# Patient Record
Sex: MALE | Race: WHITE | NOT HISPANIC OR LATINO | URBAN - METROPOLITAN AREA
[De-identification: names, ages, dates, MRNs, and addresses within clinical notes are randomized per-mention and may not be internally consistent; named-entity substitution may affect disease eponyms.]

---

## 2017-12-02 ENCOUNTER — EMERGENCY (EMERGENCY)
Facility: HOSPITAL | Age: 22
LOS: 0 days | Discharge: HOME | End: 2017-12-02

## 2017-12-02 DIAGNOSIS — Z20.2 CONTACT WITH AND (SUSPECTED) EXPOSURE TO INFECTIONS WITH A PREDOMINANTLY SEXUAL MODE OF TRANSMISSION: ICD-10-CM

## 2018-12-03 PROBLEM — Z00.00 ENCOUNTER FOR PREVENTIVE HEALTH EXAMINATION: Status: ACTIVE | Noted: 2018-12-03

## 2019-01-16 ENCOUNTER — APPOINTMENT (OUTPATIENT)
Dept: UROLOGY | Facility: CLINIC | Age: 24
End: 2019-01-16
Payer: COMMERCIAL

## 2019-01-16 VITALS
HEART RATE: 54 BPM | WEIGHT: 150 LBS | HEIGHT: 70 IN | SYSTOLIC BLOOD PRESSURE: 102 MMHG | DIASTOLIC BLOOD PRESSURE: 50 MMHG | BODY MASS INDEX: 21.47 KG/M2

## 2019-01-16 DIAGNOSIS — Z80.8 FAMILY HISTORY OF MALIGNANT NEOPLASM OF OTHER ORGANS OR SYSTEMS: ICD-10-CM

## 2019-01-16 DIAGNOSIS — Z78.9 OTHER SPECIFIED HEALTH STATUS: ICD-10-CM

## 2019-01-16 DIAGNOSIS — I86.1 SCROTAL VARICES: ICD-10-CM

## 2019-01-16 PROCEDURE — 99203 OFFICE O/P NEW LOW 30 MIN: CPT

## 2019-01-16 NOTE — LETTER BODY
[Dear  ___] : Dear  [unfilled], [Consult Letter:] : I had the pleasure of evaluating your patient, [unfilled]. [Please see my note below.] : Please see my note below. [Consult Closing:] : Thank you very much for allowing me to participate in the care of this patient.  If you have any questions, please do not hesitate to contact me. [Sincerely,] : Sincerely, [FreeTextEntry2] : Dr. Chucho Morley

## 2019-01-16 NOTE — HISTORY OF PRESENT ILLNESS
[Currently Experiencing ___] :  [unfilled] [5] : 5 [Aching] : aching [___ Month(s) Ago] : [unfilled] month(s) ago [Intermittent] : intermittently [Mild] : mild [Unchanged] : unchanged [None] : No relieving factors are noted [FreeTextEntry1] : MIKAYLA HARRY is a 23 year old male with a past medical history of left varicocele diagnosed over 14 years ago. Presents to the office today for intermittent right testicular aching pain x 3 months. Patient states that pain last a couple of hours and resolves on its own. Denies any trauma to area and no heavy lifting. Patient was seen by PMD and US was done which revealed moderate left varicocele. Denies urinary symptoms, including dysuria, hematuria, flank pain,fever or chills. No history of STD's. Has 1 steady partner, no protection used.\par \par states has low sex drive and energy\par \par US 11/2/2018\par -moderate left varicocele 0.37 cm\par -unremarkable testes, epididymitis. \par  [de-identified] : right testicle [de-identified] : after intercourse

## 2019-01-16 NOTE — PHYSICAL EXAM
[General Appearance - Well Developed] : well developed [General Appearance - Well Nourished] : well nourished [Normal Appearance] : normal appearance [Well Groomed] : well groomed [General Appearance - In No Acute Distress] : no acute distress [Edema] : no peripheral edema [Respiration, Rhythm And Depth] : normal respiratory rhythm and effort [Exaggerated Use Of Accessory Muscles For Inspiration] : no accessory muscle use [Abdomen Soft] : soft [Abdomen Tenderness] : non-tender [Costovertebral Angle Tenderness] : no ~M costovertebral angle tenderness [Normal Station and Gait] : the gait and station were normal for the patient's age [] : no rash [No Focal Deficits] : no focal deficits [Oriented To Time, Place, And Person] : oriented to person, place, and time [Affect] : the affect was normal [Mood] : the mood was normal [Not Anxious] : not anxious [Urethral Meatus] : meatus normal [Testes Mass (___cm)] : there were no testicular masses [FreeTextEntry1] : left grade 3 varicocele, right grade 2, left testicle slightly atrophic

## 2019-01-16 NOTE — ASSESSMENT
[FreeTextEntry1] : MIKAYLA HARRY is a 23 year old male with a past medical history of left varicocele diagnosed over 14 years ago. Presents to the office today for intermittent right testicular aching pain x 3 months. Patient states that pain last a couple of hours and resolves on its own. Denies any trauma to area and no heavy lifting. Patient was seen by PMD and US was done which revealed moderate left varicocele. Denies urinary symptoms, including dysuria, hematuria, flank pain,fever or chills. No history of STD's. Has 1 steady partner, no protection used.\par \par states has low sex drive and energy\par \par US 11/2/2018\par -moderate left varicocele 0.37 cm\par -unremarkable testes, epididymitis. \par

## 2019-01-19 ENCOUNTER — LABORATORY RESULT (OUTPATIENT)
Age: 24
End: 2019-01-19

## 2019-01-19 ENCOUNTER — OUTPATIENT (OUTPATIENT)
Dept: OUTPATIENT SERVICES | Facility: HOSPITAL | Age: 24
LOS: 1 days | Discharge: HOME | End: 2019-01-19

## 2019-01-19 DIAGNOSIS — F52.0 HYPOACTIVE SEXUAL DESIRE DISORDER: ICD-10-CM

## 2019-01-23 LAB
T4 SERPL-MCNC: 6.8 UG/DL
TSH SERPL-ACNC: 0.84 UIU/ML

## 2019-01-28 LAB
TESTOST BND SERPL-MCNC: 17.5 PG/ML
TESTOST SERPL-MCNC: 686.7 NG/DL

## 2019-02-06 ENCOUNTER — APPOINTMENT (OUTPATIENT)
Dept: UROLOGY | Facility: CLINIC | Age: 24
End: 2019-02-06
Payer: COMMERCIAL

## 2019-02-06 VITALS — BODY MASS INDEX: 21.47 KG/M2 | HEIGHT: 70 IN | WEIGHT: 150 LBS

## 2019-02-06 DIAGNOSIS — N50.811 RIGHT TESTICULAR PAIN: ICD-10-CM

## 2019-02-06 DIAGNOSIS — I86.1 SCROTAL VARICES: ICD-10-CM

## 2019-02-06 DIAGNOSIS — F52.0 HYPOACTIVE SEXUAL DESIRE DISORDER: ICD-10-CM

## 2019-02-06 PROCEDURE — 99213 OFFICE O/P EST LOW 20 MIN: CPT

## 2019-02-06 NOTE — PHYSICAL EXAM
[General Appearance - Well Developed] : well developed [General Appearance - Well Nourished] : well nourished [Abdomen Soft] : soft [Abdomen Tenderness] : non-tender [Urethral Meatus] : meatus normal [Testes Mass (___cm)] : there were no testicular masses [Skin Color & Pigmentation] : normal skin color and pigmentation [Skin Turgor] : supple [Heart Rate And Rhythm] : Heart rate and rhythm were normal [Edema] : no peripheral edema [] : no respiratory distress [Exaggerated Use Of Accessory Muscles For Inspiration] : no accessory muscle use [Normal Station and Gait] : the gait and station were normal for the patient's age [No Focal Deficits] : no focal deficits [Sensation] : the sensory exam was normal to light touch and pinprick [FreeTextEntry1] : left grade 3 varicocele, right grade 2, left testicle borderline small [Oriented To Time, Place, And Person] : oriented to person, place, and time [Not Anxious] : not anxious

## 2019-02-06 NOTE — HISTORY OF PRESENT ILLNESS
[FreeTextEntry1] : MIKAYLA HARRY is a 23 year old male with a past medical history of left varicocele diagnosed over 14 years ago. Presents to the office today for intermittent right testicular aching pain x 3 months. Patient states that pain last a couple of hours and resolves on its own. Denies any trauma to area and no heavy lifting. Patient was seen by PMD and US was done which revealed moderate left varicocele. Denies urinary symptoms, including dysuria, hematuria, flank pain,fever or chills. No history of STD's. Has 1 steady partner, no protection used.\par \par states has low sex drive and energy\par \par US 11/2/2018\par -moderate left varicocele 0.37 cm\par -unremarkable testes, epididymitis. \par  \par Jan 2019\par TSH - normal at 0.84\par T4, Serum-- normal at 6.8 \par Total T normal at 605, bioavailable at 230

## 2019-04-21 NOTE — REVIEW OF SYSTEMS
If he is referring to the allopurinol I would say yes.   [see HPI] : see HPI [Negative] : Heme/Lymph [Dysuria] : no dysuria [Incontinence] : no incontinence [Nocturia] : no nocturia